# Patient Record
Sex: FEMALE | Race: WHITE
[De-identification: names, ages, dates, MRNs, and addresses within clinical notes are randomized per-mention and may not be internally consistent; named-entity substitution may affect disease eponyms.]

---

## 2017-09-14 ENCOUNTER — HOSPITAL ENCOUNTER (OUTPATIENT)
Dept: HOSPITAL 76 - LAB.N | Age: 67
Discharge: HOME | End: 2017-09-14
Attending: NURSE PRACTITIONER
Payer: MEDICARE

## 2017-09-14 DIAGNOSIS — E04.1: ICD-10-CM

## 2017-09-14 DIAGNOSIS — Z79.899: Primary | ICD-10-CM

## 2017-09-14 PROCEDURE — 80175 DRUG SCREEN QUAN LAMOTRIGINE: CPT

## 2017-09-14 PROCEDURE — 36415 COLL VENOUS BLD VENIPUNCTURE: CPT

## 2017-09-14 PROCEDURE — 84443 ASSAY THYROID STIM HORMONE: CPT

## 2019-02-19 ENCOUNTER — HOSPITAL ENCOUNTER (OUTPATIENT)
Dept: HOSPITAL 76 - LAB.N | Age: 69
Discharge: HOME | End: 2019-02-19
Attending: NURSE PRACTITIONER
Payer: MEDICARE

## 2019-02-19 DIAGNOSIS — Z79.899: Primary | ICD-10-CM

## 2019-02-19 LAB
ALBUMIN DIAFP-MCNC: 4.5 G/DL (ref 3.2–5.5)
ALBUMIN/GLOB SERPL: 1.7 {RATIO} (ref 1–2.2)
ALP SERPL-CCNC: 69 IU/L (ref 42–121)
ALT SERPL W P-5'-P-CCNC: 19 IU/L (ref 10–60)
ANION GAP SERPL CALCULATED.4IONS-SCNC: 10 MMOL/L (ref 6–13)
AST SERPL W P-5'-P-CCNC: 23 IU/L (ref 10–42)
BILIRUB BLD-MCNC: 0.5 MG/DL (ref 0.2–1)
BUN SERPL-MCNC: 16 MG/DL (ref 6–20)
CALCIUM UR-MCNC: 9.3 MG/DL (ref 8.5–10.3)
CHLORIDE SERPL-SCNC: 97 MMOL/L (ref 101–111)
CO2 SERPL-SCNC: 25 MMOL/L (ref 21–32)
CREAT SERPLBLD-SCNC: 0.6 MG/DL (ref 0.4–1)
GFRSERPLBLD MDRD-ARVRAT: 100 ML/MIN/{1.73_M2} (ref 89–?)
GLOBULIN SER-MCNC: 2.6 G/DL (ref 2.1–4.2)
GLUCOSE SERPL-MCNC: 116 MG/DL (ref 70–100)
PROT SPEC-MCNC: 7.1 G/DL (ref 6.7–8.2)
SODIUM SERPLBLD-SCNC: 132 MMOL/L (ref 135–145)

## 2019-02-19 PROCEDURE — 80175 DRUG SCREEN QUAN LAMOTRIGINE: CPT

## 2019-02-19 PROCEDURE — 36415 COLL VENOUS BLD VENIPUNCTURE: CPT

## 2019-02-19 PROCEDURE — 80053 COMPREHEN METABOLIC PANEL: CPT

## 2019-06-03 ENCOUNTER — HOSPITAL ENCOUNTER (OUTPATIENT)
Dept: HOSPITAL 76 - LAB.N | Age: 69
Discharge: HOME | End: 2019-06-03
Attending: NURSE PRACTITIONER
Payer: MEDICARE

## 2019-06-03 DIAGNOSIS — E87.1: Primary | ICD-10-CM

## 2019-06-03 LAB
ANION GAP SERPL CALCULATED.4IONS-SCNC: 10 MMOL/L (ref 6–13)
BUN SERPL-MCNC: 13 MG/DL (ref 6–20)
CALCIUM UR-MCNC: 9.4 MG/DL (ref 8.5–10.3)
CHLORIDE SERPL-SCNC: 101 MMOL/L (ref 101–111)
CO2 SERPL-SCNC: 23 MMOL/L (ref 21–32)
CREAT SERPLBLD-SCNC: 0.8 MG/DL (ref 0.4–1)
GFRSERPLBLD MDRD-ARVRAT: 72 ML/MIN/{1.73_M2} (ref 89–?)
GLUCOSE SERPL-MCNC: 97 MG/DL (ref 70–100)
SODIUM SERPLBLD-SCNC: 134 MMOL/L (ref 135–145)

## 2019-06-03 PROCEDURE — 80048 BASIC METABOLIC PNL TOTAL CA: CPT

## 2019-06-03 PROCEDURE — 36415 COLL VENOUS BLD VENIPUNCTURE: CPT

## 2020-12-15 ENCOUNTER — HOSPITAL ENCOUNTER (OUTPATIENT)
Dept: HOSPITAL 76 - LAB.WCP | Age: 70
Discharge: HOME | End: 2020-12-15
Attending: NURSE PRACTITIONER
Payer: MEDICAID

## 2020-12-15 DIAGNOSIS — E87.1: ICD-10-CM

## 2020-12-15 DIAGNOSIS — E04.1: Primary | ICD-10-CM

## 2020-12-15 LAB
ALBUMIN DIAFP-MCNC: 4.6 G/DL (ref 3.2–5.5)
ALBUMIN/GLOB SERPL: 1.8 {RATIO} (ref 1–2.2)
ALP SERPL-CCNC: 68 IU/L (ref 42–121)
ALT SERPL W P-5'-P-CCNC: 21 IU/L (ref 10–60)
ANION GAP SERPL CALCULATED.4IONS-SCNC: 10 MMOL/L (ref 6–13)
AST SERPL W P-5'-P-CCNC: 28 IU/L (ref 10–42)
BASOPHILS NFR BLD AUTO: 0.1 10^3/UL (ref 0–0.1)
BASOPHILS NFR BLD AUTO: 0.9 %
BILIRUB BLD-MCNC: 0.8 MG/DL (ref 0.2–1)
BUN SERPL-MCNC: 10 MG/DL (ref 6–20)
CALCIUM UR-MCNC: 9.9 MG/DL (ref 8.5–10.3)
CHLORIDE SERPL-SCNC: 101 MMOL/L (ref 101–111)
CO2 SERPL-SCNC: 26 MMOL/L (ref 21–32)
CREAT SERPLBLD-SCNC: 0.8 MG/DL (ref 0.4–1)
EOSINOPHIL # BLD AUTO: 0.1 10^3/UL (ref 0–0.7)
EOSINOPHIL NFR BLD AUTO: 1.7 %
ERYTHROCYTE [DISTWIDTH] IN BLOOD BY AUTOMATED COUNT: 13.1 % (ref 12–15)
GLOBULIN SER-MCNC: 2.6 G/DL (ref 2.1–4.2)
GLUCOSE SERPL-MCNC: 126 MG/DL (ref 70–100)
HGB UR QL STRIP: 13.6 G/DL (ref 12–16)
LYMPHOCYTES # SPEC AUTO: 1.7 10^3/UL (ref 1.5–3.5)
LYMPHOCYTES NFR BLD AUTO: 20.6 %
MCH RBC QN AUTO: 29.8 PG (ref 27–31)
MCHC RBC AUTO-ENTMCNC: 32.9 G/DL (ref 32–36)
MCV RBC AUTO: 90.6 FL (ref 81–99)
MONOCYTES # BLD AUTO: 0.7 10^3/UL (ref 0–1)
MONOCYTES NFR BLD AUTO: 8.7 %
NEUTROPHILS # BLD AUTO: 5.5 10^3/UL (ref 1.5–6.6)
NEUTROPHILS # SNV AUTO: 8 X10^3/UL (ref 4.8–10.8)
NEUTROPHILS NFR BLD AUTO: 67.9 %
PDW BLD AUTO: 9.8 FL (ref 7.9–10.8)
PLATELET # BLD: 309 10^3/UL (ref 130–450)
PROT SPEC-MCNC: 7.2 G/DL (ref 6.7–8.2)
RBC MAR: 4.56 10^6/UL (ref 4.2–5.4)
SODIUM SERPLBLD-SCNC: 137 MMOL/L (ref 135–145)

## 2020-12-15 PROCEDURE — 85025 COMPLETE CBC W/AUTO DIFF WBC: CPT

## 2020-12-15 PROCEDURE — 84443 ASSAY THYROID STIM HORMONE: CPT

## 2020-12-15 PROCEDURE — 36415 COLL VENOUS BLD VENIPUNCTURE: CPT

## 2020-12-15 PROCEDURE — 80053 COMPREHEN METABOLIC PANEL: CPT

## 2021-04-08 ENCOUNTER — HOSPITAL ENCOUNTER (OUTPATIENT)
Dept: HOSPITAL 76 - LAB.WCP | Age: 71
Discharge: HOME | End: 2021-04-08
Attending: FAMILY MEDICINE
Payer: MEDICARE

## 2021-04-08 DIAGNOSIS — E78.5: Primary | ICD-10-CM

## 2021-04-08 LAB
CHOLEST SERPL-MCNC: 176 MG/DL
HDLC SERPL-MCNC: 68 MG/DL
HDLC SERPL: 2.6 {RATIO} (ref ?–4.4)
LDLC SERPL CALC-MCNC: 91 MG/DL
LDLC/HDLC SERPL: 1.3 {RATIO} (ref ?–4.4)
TRIGL P FAST SERPL-MCNC: 87 MG/DL
VLDLC SERPL-SCNC: 17 MG/DL

## 2021-04-08 PROCEDURE — 83721 ASSAY OF BLOOD LIPOPROTEIN: CPT

## 2021-04-08 PROCEDURE — 80061 LIPID PANEL: CPT

## 2021-04-08 PROCEDURE — 36415 COLL VENOUS BLD VENIPUNCTURE: CPT

## 2021-04-09 ENCOUNTER — HOSPITAL ENCOUNTER (OUTPATIENT)
Dept: HOSPITAL 76 - LAB.WCP | Age: 71
Discharge: HOME | End: 2021-04-09
Attending: FAMILY MEDICINE
Payer: MEDICARE

## 2021-04-09 DIAGNOSIS — R32: Primary | ICD-10-CM

## 2021-04-09 LAB
CLARITY UR REFRACT.AUTO: CLEAR
GLUCOSE UR QL STRIP.AUTO: NEGATIVE MG/DL
KETONES UR QL STRIP.AUTO: NEGATIVE MG/DL
NITRITE UR QL STRIP.AUTO: NEGATIVE
PH UR STRIP.AUTO: 6.5 PH (ref 5–7.5)
PROT UR STRIP.AUTO-MCNC: NEGATIVE MG/DL
RBC # UR STRIP.AUTO: NEGATIVE /UL
RBC # URNS HPF: (no result) /HPF (ref 0–5)
SP GR UR STRIP.AUTO: 1.01 (ref 1–1.03)
SQUAMOUS URNS QL MICRO: (no result)
UROBILINOGEN UR QL STRIP.AUTO: (no result) E.U./DL
UROBILINOGEN UR STRIP.AUTO-MCNC: NEGATIVE MG/DL
WBC # UR MANUAL: (no result) /HPF (ref 0–5)

## 2021-04-09 PROCEDURE — 81001 URINALYSIS AUTO W/SCOPE: CPT

## 2021-04-09 PROCEDURE — 87086 URINE CULTURE/COLONY COUNT: CPT

## 2021-04-29 ENCOUNTER — HOSPITAL ENCOUNTER (OUTPATIENT)
Dept: HOSPITAL 76 - NS | Age: 71
Discharge: HOME | End: 2021-04-29
Attending: FAMILY MEDICINE
Payer: MEDICARE

## 2021-04-29 DIAGNOSIS — Z71.3: Primary | ICD-10-CM

## 2021-04-29 DIAGNOSIS — R53.83: ICD-10-CM

## 2021-04-29 PROCEDURE — 97802 MEDICAL NUTRITION INDIV IN: CPT

## 2021-06-10 ENCOUNTER — HOSPITAL ENCOUNTER (OUTPATIENT)
Dept: HOSPITAL 76 - DI | Age: 71
Discharge: HOME | End: 2021-06-10
Attending: FAMILY MEDICINE
Payer: MEDICARE

## 2021-06-10 ENCOUNTER — HOSPITAL ENCOUNTER (OUTPATIENT)
Dept: HOSPITAL 76 - DI | Age: 71
Discharge: HOME | End: 2021-06-10
Attending: GENERAL ACUTE CARE HOSPITAL
Payer: MEDICARE

## 2021-06-10 DIAGNOSIS — Z12.2: Primary | ICD-10-CM

## 2021-06-10 DIAGNOSIS — R91.8: ICD-10-CM

## 2021-06-10 DIAGNOSIS — Z87.891: ICD-10-CM

## 2021-06-10 DIAGNOSIS — Z12.31: Primary | ICD-10-CM

## 2021-06-10 DIAGNOSIS — R92.8: ICD-10-CM

## 2021-06-10 DIAGNOSIS — E04.9: ICD-10-CM

## 2021-06-10 NOTE — CT REPORT
PROCEDURE:  Low Dose Lung Cancer Screen

 

INDICATIONS:  HISTORY OF TOBACCO USE

 

TECHNIQUE:  

Noncontrast low-dose 5 mm thick sections acquired from the pulmonary apices to the posterior costophr
enic angles.  7 mm thick coronal and sagittal MIP reformats were then acquired.  For radiation dose r
eduction, the following was used:  automated exposure control, adjustment of mA and/or kV according t
o patient size.

 

COMPARISON:  None.

 

FINDINGS:  

Image quality:  Excellent.  

 

Lungs and pleura:  There is a spiculated, subsolid pulmonary nodule in the posterior right lower lobe
 with a somewhat lobulated appearance that exists in the area of pre-existing cavities. On image 216/
4 it measures 2.3 cm in diameter. It is highly suspicious for bronchogenic carcinoma. A short distanc
e from there is a groundglass opacity in the right lower lobe which measures 0.8 cm on image 208/4.

 

Mediastinum:  Heart size is normal.  No pericardial effusion.  Minimal coronary artery calcifications
. No mediastinal adenopathy by size criteria.  Thoracic aorta and central pulmonary arteries are norm
al in size.  Esophagus is normal in caliber.  No hiatal hernia.  

 

Bones and chest wall:  No suspicious bony lesions.  No vertebral body compression fractures.  No axil
angel or supraclavicular adenopathy by size criteria.  The thyroid is suboptimally visualized on this 
low-dose study. It appears enlarged.

 

Abdomen:  Visualized upper abdomen solid organs and bowel loops appear normal in the absence of contr
ast.  

 

IMPRESSION:  

1. Spiculated, somewhat lobulated, subsolid right lower lobe mass measuring at least 2.3 cm in diamet
er, occurring in a pre-existing cavitary region, highly suspicious for bronchogenic carcinoma. It mos
t likely represents adenocarcinoma.

2. There is also a subtle groundglass opacity measuring 0.8 cm in the right lower lobe, fairly close 
to the spiculated lesion. This may potentially represent an area of carcinomas in situ.

3. Enlarged thyroid

 

Comment: Recommend PET/CT. The 2.3 cm pulmonary lesion would be amenable to CT-guided biopsy. However
, there would be an increased risk of requiring a chest tube due to the pulmonary cavities.

 

Additional comment: Consider thyroid ultrasound for better evaluation of the thyroid.

 

 

CLINICAL RECOMMENDATION STATEMENTS:

In patients <35 years with an ITN detected on CT, MRI, or extrathyroidal ultrasound, the Committee re
commends further evaluation with dedicated thyroid ultrasound if the nodule is ?1 cm and has no suspi
cious imaging features, and if the patient has normal life expectancy.

In patients ?35 years with an ITN detected on CT, MRI, or extrathyroidal ultrasound, the Committee re
commends further evaluation with dedicated thyroid ultrasound if the nodule is ?1.5 cm and has no mini
picious imaging features, and if the patient has normal life expectancy. (ACR, 2014)

 

Reviewed by: Jean Jolly MD on 6/10/2021 2:38 PM PDT

Approved by: Jean Jolly MD on 6/10/2021 2:38 PM PDT

 

 

Station ID:  [id number]

## 2021-06-11 NOTE — MAMMOGRAPHY REPORT
BILATERAL DIGITAL SCREENING MAMMOGRAM 3D/2D: 6/10/2021

 

CLINICAL: Baseline exam. Routine screening. Pt states she has not had a mammogram in over 20 years or
 more. Maybe had one prior to moving here 14 years ago over 20 years ago.  

 

No prior exams were available for comparison.  There are scattered fibroglandular elements in both br
easts.  

 

 

There is a focal asymmetry in the left breast at 12 o'clock anterior depth.  

No other significant masses, calcifications, or other findings are seen in either breast.  

 

IMPRESSION: INCOMPLETE: NEEDS ADDITIONAL IMAGING EVALUATION

The focal asymmetry in the left breast is indeterminate.  Additional views with possible ultrasound a
re recommended.  

 

 

 

This exam was interpreted at Station ID: [id number].  

 

NOTE: For mammograms, a report in lay terms will be sent to the patient. Approximately 15% of breast 
malignancies will not be visualized mammographically. In the management of a palpable breast mass, a 
negative mammogram must not discourage biopsy of a clinically suspicious lesion.

 

Electronically Signed By: Kelton Graff M.D., jr/lisa:6/10/2021 15:10:12  

 

 

 

ACR BI-RADS Category 0: Incomplete 3340F

PARENCHYMAL PATTERN: (A) - The breast(s) demonstrate(s) scattered fibroglandular densities.

BI-RADS CATEGORY: (0) - 0

RECOMMENDATION: (ADDMAM) - Recommend additional mammographic views.

[id number]

Immediate follow-up

LATERALITY: (B)

## 2021-12-02 ENCOUNTER — HOSPITAL ENCOUNTER (OUTPATIENT)
Dept: HOSPITAL 76 - DI | Age: 71
Discharge: HOME | End: 2021-12-02
Attending: FAMILY MEDICINE
Payer: MEDICARE

## 2021-12-02 DIAGNOSIS — N63.22: Primary | ICD-10-CM

## 2021-12-03 NOTE — MAMMOGRAPHY REPORT
UNILATERAL LEFT DIGITAL DIAGNOSTIC MAMMOGRAM 3D/2D: 12/2/2021

CLINICAL: Patient returns today to evaluate a focal asymmetry in the left breast.  

 

Comparison is made to exam dated:  6/10/2021 mammogram - [facility].  There are sca
ttered fibroglandular elements in left breast.  

 

There is an oval equal density focal asymmetry with an indistinct margin in the left breast at 12 o'c
lock middle depth.  This is less prominent on additional views.  

No other significant masses or calcifications are seen in the breast.  

 

IMPRESSION: INCOMPLETE: NEEDS ADDITIONAL IMAGING EVALUATION

The oval equal density focal asymmetry in the left breast is indeterminate.  An ultrasound is recomme
nded.  

 

Ultrasound will be performed immediately following the current exam. 

 

This exam was interpreted at Station ID: [id number].  

 

NOTE: For mammograms, a report in lay terms will be sent to the patient. Approximately 15% of breast 
malignancies will not be visualized mammographically. In the management of a palpable breast mass, a 
negative mammogram must not discourage biopsy of a clinically suspicious lesion.

 

Electronically Signed By: Ruiz Rocha M.D.          

ddp/:12/2/2021 11:52:56  

 

 

 

ACR BI-RADS Category 0: Incomplete 3340F

PARENCHYMAL PATTERN: (A) - The breast(s) demonstrate(s) scattered fibroglandular densities.

BI-RADS CATEGORY: (0) - 0

Ultrasound

[id number]

Immediate follow-up

LATERALITY: (B)

## 2021-12-03 NOTE — ULTRASOUND REPORT
LIMITED ULTRASOUND OF LEFT BREAST: 12/2/2021

CLINICAL: Patient returns today to evaluate a focal asymmetry in the left breast.  

 

Comparison is made to exams dated:  12/2/2021 mammogram and 6/10/2021 mammogram - [facility].  

 Color flow and real-time ultrasound of the left breast 11-1 o'clock region were performed on the are
as of interest.  Gray scale images of the real-time examination were reviewed.  

 

There is a 1 cm x 0.2 cm x 0.3 cm oval mass suggestive of a lymph node with circumscribed margins in 
the left breast at 1 o'clock middle depth 4 cm from the nipple.  This oval lymph node is of mixed ech
ogenicity with a fatty hilum.  This correlates with mammography findings.  Color flow imaging demonst
rates that there is an adjacent vascularity in the region of the hilum.  

 

 

IMPRESSION: PROBABLY BENIGN 

The 1 cm x 0.2 cm x 0.3 cm oval mass suggestive of a lymph node in the left breast is probably benign
.  Follow-up mammogram and ultrasound in 6 months are recommended.  

 

A follow-up mammogram and an ultrasound in 6 months is recommended to demonstrate stability.   

 

This exam was interpreted at Station ID: [id number].  

Electronically Signed By: Ruiz Rocha M.D.  

ddp/:12/2/2021 13:28:04  

 

 

 

Ultrasound BI-RADS: 3 Probably benign

BI-RADS CATEGORY: (3) - 3

Mammo and US

[id number]

6 month follow-up

LATERALITY: (B)

## 2021-12-15 ENCOUNTER — HOSPITAL ENCOUNTER (OUTPATIENT)
Dept: HOSPITAL 76 - LAB | Age: 71
Discharge: HOME | End: 2021-12-15
Attending: FAMILY MEDICINE
Payer: MEDICARE

## 2021-12-15 DIAGNOSIS — G43.909: Primary | ICD-10-CM

## 2021-12-15 DIAGNOSIS — C34.90: ICD-10-CM

## 2021-12-15 LAB
CREAT SERPLBLD-SCNC: 0.7 MG/DL (ref 0.4–1)
GFRSERPLBLD MDRD-ARVRAT: 83 ML/MIN/{1.73_M2} (ref 89–?)

## 2021-12-15 PROCEDURE — 70553 MRI BRAIN STEM W/O & W/DYE: CPT

## 2021-12-15 PROCEDURE — 36415 COLL VENOUS BLD VENIPUNCTURE: CPT

## 2021-12-15 PROCEDURE — 82565 ASSAY OF CREATININE: CPT

## 2021-12-15 NOTE — MRI REPORT
PROCEDURE:  Brain W/WO

 

INDICATIONS:  MIGRAINE HA, ADENOCARCINOMA

 

CONTRAST:  IV CONTRAST: Gadavist ml: 6.0  

 

TECHNIQUE:  

Noncontrast axial T1 spin echo, axial T2 fast spin echo, sagittal and axial FLAIR, coronal T2 fast sp
in echo, axial gradient echo, axial diffusion and ADC through the brain.  After the administration of
 contrast, axial and coronal T1 spin echo with fat saturation through the brain.  

 

COMPARISON:  None.

 

FINDINGS:  

Image quality:  Excellent.  

 

CSF spaces:  Basal cisterns are patent.  No extra-axial fluid collections.  Ventricles are normal in 
size and shape.  

 

Brain:  No midline shift.  No intracranial bleeds or masses.  No abnormal intracranial enhancement.  
There is cerebral volume loss for age.  There is very mild periventricular white matter chronic small
 vessel ischemic change.  The brainstem appears normal.  Diffusion-weighted images demonstrate no acu
te ischemic insults.  No chronic ischemic insults.  Normal intravascular flow voids are present.  

 

Skull and face:  Calvarial marrow is normal in signal.  Orbits appear normal.  

 

Sinuses:  Sinuses and mastoids appear clear.  

 

IMPRESSION:  Normal brain MRI with and without contrast for patient age. No evidence acute stroke, he
morrhage, or mass.

 

Reviewed by: Jean Jolly MD on 12/15/2021 2:28 PM PST

Approved by: Jean Jolly MD on 12/15/2021 2:28 PM PST

 

 

Station ID:  [id number]

## 2021-12-20 ENCOUNTER — HOSPITAL ENCOUNTER (OUTPATIENT)
Dept: HOSPITAL 76 - SC | Age: 71
Discharge: HOME | End: 2021-12-20
Attending: INTERNAL MEDICINE
Payer: MEDICARE

## 2021-12-20 DIAGNOSIS — R06.81: ICD-10-CM

## 2021-12-20 DIAGNOSIS — G47.10: Primary | ICD-10-CM

## 2021-12-20 DIAGNOSIS — G47.8: ICD-10-CM

## 2021-12-20 PROCEDURE — 99202 OFFICE O/P NEW SF 15 MIN: CPT

## 2021-12-20 PROCEDURE — 99212 OFFICE O/P EST SF 10 MIN: CPT

## 2021-12-21 VITALS — SYSTOLIC BLOOD PRESSURE: 104 MMHG | DIASTOLIC BLOOD PRESSURE: 64 MMHG

## 2021-12-21 NOTE — SLEEP CARE CONSULTATION
Information from patient questionnaire entered by Diego Otto MA.





I have reviewed and concur with the information entered by Diego Otto MA. 

This document represents the service I personally performed and the decisions 

made by me, Cornelius Cash MD, [facility].





History of Present Illness


Service Date and Time: 2021    1030


Reason for Visit: New patient


Chief Complaint: reports: Excessive daytime sleepiness, Observed pauses in 

breathing, Fatigue, Frequent awakenings at night


Date of Onset: at least 5 years


Usual bedtime: 1100 pm


Time it takes to fall asleep: less than 5 minutes


Snores at night: No


Observed to quit breathing while asleep: Yes


Number of times waking at night: 3


Reasons for waking at night: reports: Gasping for air, Bathroom, Other (unknown 

reasons)


Toss, Turn, or Twitch while sleeping: Yes


Recalls having dreams: Yes


Usually gets out of bed at: 0700


Feels refreshed in the morning: No


Morning headache: No


Sleepy or fatigued during the day: Yes


Ever fallen asleep while driving: No


Takes day naps: Yes


Dreams during day naps: No


Prior sleep studies: No


Additional HPI information: 


I have the pleasure of seeing Ms. Pepper today regarding the possibility of her 

having obstructive sleep apnea.  As you know, she is a 71 year old lady who 

complains of observed apneas, frequent awakenings, persistent fatigue, and 

excessive daytime sleepiness for at least 5 years.  A sister who is a sleep 

technician told her that she quits breathing at night.  She does not snore much.

 The patient tells me that she normally goes to bed around 11 pm, and it takes 

her approximately < 5 minutes to fall asleep.  She can recall waking up on the 

average of 3 times during the night.  Most of the time she wakes up because of 

having to use the bathroom.    She has awakened occasionally because of her own 

snoring, choking, and having to gasp for air.  There is a lot of tossing and 

turning in her sleep.  She reports somniloquy (sleep talking) and somnambulism 

(sleep walking).  Generally, she can recall having dreams.  In the morning she 

usually gets up out of the bed around 7 a.m. not feeling refreshed nor rested.  

She usually does not have a morning headache.  During the day she complains of 

feeling sleepy and fatigued.  Her score on [place] Sleepiness Scale is 13 out of

24.  She has fallen asleep while driving and has gone out of the nuno.  She 

usually does not take naps during the day.  Upon falling asleep during the day 

she denies having vivid dreams.  She has never had sleep paralysis, experienced 

cataplexy but reports symptoms of restless leg syndrome.  She reports having 

impaired concentration during the day.








- Parasomnia Symptoms


Ever been unable to move upon waking from sleep: Yes (in 20s)


Walks in sleep: Yes (as a child)


Talks in sleep: Yes


Ever acted out dreams in sleep: No


Ever felt weak in the knees when startled or emotional: No


Bothered by creepy, crawly, restless sensations in legs: Yes


Problems with memory or concentration: Yes





Subjective


Initial [place] Sleepiness Scale score: 13 ()





Past Medical History


Past Medical History: reports: Claustrophobia, Arthritis, Anxiety, Depression, 

Attention deficit





Social History


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 Patient is Single and lives in [place]. 





Have you smoked in the past 12 months: Yes


Cigarettes per day (20/pack): 9


Years of smokin


Quit date: 


Smoking Pack Years: 14.0


Alcohol use: Yes


Alcohol amount and frequency: 2 x yearly


Caffeine use: Yes


Caffeine amount and frequency: 2 x daily





Family History


Family Hx Sleep Apnea: Mother: Sleep apnea - Untreated





Allergies and Home Medications


Known drug allergies: Yes ([facility], Erythomycine, Clindomycine)


Drug allergies reviewed: Yes


Home medication list reviewed: Yes


Allergy and home medication list: 





Magnesium 350 mg daily





Vitamin D3 4000mg daily





Fish Oil 2400mg   daily





Biotin  2500mg daily





Kelp  550mg daily





Coenzyme B-complex  1000 daily





L-Lysine  1000mg 3-4x week  





LEIGH Lieberman 21 10:45 am 











Review of Systems


Cardiovascular: denies: high blood pressure, palpitations, chest pain, irregular

heart rate or pulse, leg or foot swelling, have to sleep sitting up, other


Respiratory: denies: shortness of breath, wheeze, sputum production, chronic 

cough, other


Gastrointestinal: denies: heartburn, difficulty swallowing, nausea, vomitting, 

diarrhea, abdominal pain, other


Urinary: reports: incontinence, frequency


Neurological: reports: headaches


Psychiatric: denies: Attention Deficit Hyperactivity, anxiety, depression, mood 

disorder, claustrophobia, other


Ear/Nose/Throat: reports: sinus problems


Endocrine: denies: thyroid disease, history of goiter, sluggishness, too hot or 

cold, excessive thirst, increased appetite, increased urination, unexplained 

weakness, other


Musculoskeletal: denies: joint pain, neck pain, back pain, joint swelling, 

muscle pain or cramping, mobility problems, other


Immunologic: reports: sneezing, rash, itching





Physical Exam


Vital signs obtained and entered by: TOBIAS OTTO CMA AAMA


Blood Pressure: 104/64 (left)


Cuff size: wrist


Heart Rate: 54


O2 Saturation: 98 (with mask )


Height: 5 ft 3 in


Weight: 100 lb


Body Mass Index: 17.6


BMI Classification: Underweight





Impression and Plan


IMPRESSION: 1. Obstructive Sleep Apnea-Hypopnea Syndrome, as evident by history 

of observed cessation of breath while asleep, frequent awakenings during the 

night, nocturnal choking, unrefreshed sleep, cognitive impairment, and daytime 

hypersomnolence. I recommend proceeding to polysomnography to confirm the 

diagnosis and to assess severity.  If she has significant sleep disordered 

breathing, a manual CPAP titration study will also be performed to find the 

optimal treatment pressure.  I informed the patient of what the sleep studies in

volve and after some discussion, she agreed to proceed.





Plan:  1. Schedule a home sleep apnea test (HSAT) because her insurance is 

[facility].


2. Avoid long distance driving or when feeling sleepy.


3. Avoid alcohol, sedative and muscle relaxant around bedtime.





Follow up with Sleep Care in: 1-2 months


Visit Type: In Office


Time Spent with Patient (minutes): 15


Provider Statement: I spent 100% of the Face to Face Visit with the patient with

greater than 50% spent counseling the patient and coordination of care.

## 2022-01-25 ENCOUNTER — HOSPITAL ENCOUNTER (OUTPATIENT)
Dept: HOSPITAL 76 - SC | Age: 72
Discharge: HOME | End: 2022-01-25
Attending: INTERNAL MEDICINE
Payer: MEDICARE

## 2022-01-25 DIAGNOSIS — R41.89: ICD-10-CM

## 2022-01-25 DIAGNOSIS — G47.8: ICD-10-CM

## 2022-01-25 DIAGNOSIS — G47.10: Primary | ICD-10-CM

## 2022-01-25 DIAGNOSIS — R06.81: ICD-10-CM

## 2022-01-25 PROCEDURE — 95806 SLEEP STUDY UNATT&RESP EFFT: CPT

## 2022-02-07 ENCOUNTER — HOSPITAL ENCOUNTER (OUTPATIENT)
Dept: HOSPITAL 76 - SC | Age: 72
Discharge: HOME | End: 2022-02-07
Attending: INTERNAL MEDICINE
Payer: MEDICARE

## 2022-02-07 VITALS — SYSTOLIC BLOOD PRESSURE: 140 MMHG | DIASTOLIC BLOOD PRESSURE: 85 MMHG

## 2022-02-07 DIAGNOSIS — R53.83: ICD-10-CM

## 2022-02-07 DIAGNOSIS — G47.10: Primary | ICD-10-CM

## 2022-02-07 PROCEDURE — 99212 OFFICE O/P EST SF 10 MIN: CPT

## 2022-02-07 NOTE — SLEEP CARE CONSULTATION
Information from patient questionnaire entered by Diego Kowalski MA.





I have reviewed and concur with the information entered by Diego Kowalski MA. 

This document represents the service I personally performed and the decisions 

made by me, Cornelius Cash MD, [facility].





History of Present Illness


Service Date and Time: 02/07/2022    1348


Initial [place] Sleepiness Scale score: 13 (2021)


Current [place] Sleepiness Scale score: 10 (2022)


Additional HPI information: 


Ms. Pepper returned for follow up of the home sleep apnea test (HSAT) she had on 

1/25/2022.  The test showed no significant sleep disordered breathing with an 

AHI of 1.9.  The patient slept mostly supine. The test duration was slightly 

short at about 4 hours.  She recalls going to the bathroom around the time test 

ended. 





The patient was informed of these findings.  I explained to her that she quit 

breathing a few times but not enough to make the diagnosis of obstructive sleep 

apnea-hypopnea.  She continues to complain of excessive daytime sleepiness.  Her

[place] Sleepiness Scale score is 10.  








Sleep Study





- Results


Type of Sleep Study: Home sleep study (F/U HOME STUDY)


Prior sleep studies: No





Allergies and Home Medications


Known drug allergies: Yes ([facility],)


Drug allergies reviewed: Yes


Home medication list reviewed: Yes


Allergy and home medication list: 


Allergies





clindamycin Allergy (Verified 04/15/15 18:38)


   Rash


erythromycin base [Erythromycin Base] Allergy (Verified 04/15/15 18:38)


   Emesis


Penicillins Allergy (Verified 04/15/15 18:38)


   Rash











Review of Systems


Review of systems same as previous: Yes





Physical Exam


Vital signs obtained and entered by: TOBIAS KOWALSKI CMA MA


Blood Pressure: 140/85 (LEFT, PULSE 55 )


Cuff size: wrist


Heart Rate: 52


O2 Saturation: 98 (WITH PAPER MASK)


Height: 5 ft 3 in


Weight: 142 lb (WITH CLOTHES)


Body Mass Index: 25.1


BMI Classification: Overweight





Impression and Plan


IMPRESSION: 1. Hypersomnia and fatigue.  This is partly due to her napping on 

most days.  There remains a suspicion for sleep-disordered breathing and 

possible periodic leg movement disorder.  We will proceed to an in-laboratory 

polysomnography for further evaluation as HSAT does have a high false negative 

rate due to its inability to differentiate wake from sleep.





PLAN:      1.   Schedule an in-laboratory polysomnography.


2.   Avoid daytime naps.


3.   Return for a follow up after the sleep study.





Follow up with Sleep Care in: 1-2 months


Visit Type: In Office


Time Spent with Patient (minutes): 15


Provider Statement: I spent 100% of the Face to Face Visit with the patient with

greater than 50% spent counseling the patient and coordination of care.

## 2022-04-20 ENCOUNTER — HOSPITAL ENCOUNTER (OUTPATIENT)
Dept: HOSPITAL 76 - SC | Age: 72
Discharge: HOME | End: 2022-04-20
Attending: NURSE PRACTITIONER
Payer: MEDICARE

## 2022-04-20 VITALS — SYSTOLIC BLOOD PRESSURE: 137 MMHG | DIASTOLIC BLOOD PRESSURE: 68 MMHG

## 2022-04-20 DIAGNOSIS — F41.9: ICD-10-CM

## 2022-04-20 DIAGNOSIS — G47.10: ICD-10-CM

## 2022-04-20 DIAGNOSIS — R06.83: Primary | ICD-10-CM

## 2022-04-20 PROCEDURE — 99212 OFFICE O/P EST SF 10 MIN: CPT

## 2022-04-20 PROCEDURE — 99213 OFFICE O/P EST LOW 20 MIN: CPT

## 2022-04-20 NOTE — SLEEP CARE CONSULTATION
Information from patient questionnaire entered by Diego Kowalski MA.





I have reviewed and concur with the information entered by Diego Kowalski MA. 

This document represents the service I personally performed and the decisions 

made by Marcela hernandez Caren J, ARNP.





History of Present Illness


Service Date and Time: 04/20/2022    1118


Initial [place] Sleepiness Scale score: 13 (2021)


Current [place] Sleepiness Scale score: 11 (4/22)


Additional HPI information: 





ALEXA ACEVEDO returns for follow up and results of the recently performed 

polysomnography.





The patient was informed of the following findings:  No significant sleep 

disordered breathing with an average AHI of 0.4 and galo oxygen saturation of 

91%. 





I explained the pathophysiology behind obstructive sleep apnea. Patient does not

have sleep apnea and was advised how weight gain could increase the risk of 

developing sleep apnea in the future.





Patient has light snoring. Snoring can be reduced by weight loss. Weight loss is

best achieved with diet consult. Patient instructed to contact PCP for referral.

Snoring can also be treated with an oral appliance from a dentist. Advised to 

check insurance coverage. In addition, an ENT evaluation can be do to see if 

other treatment is indicated.  Patient was cautioned about risks of drowsy 

driving until sleepiness symptoms resolve.





Sleep Study





- Results


Type of Sleep Study: Polysomnography (F/U POLY, 4/1/22 [facility],)


Prior sleep studies: No


Polysomnography/Home Sleep Study results: 





IMPRESSION: The quality of the study is good. The patient had normal sleep 

efficiency. The sleep architecture


was also normal. Respiratory monitoring showed no significant sleep disordered 

breathing (AHI = 0.4) or hypoxia


(galo oxygen saturation of 91%). The patient slept mostly supine (supine AHI = 

0.5; non-supine = 0.00). Snore


was infrequent and light in intensity. There was no significant periodic leg 

movement of sleep. Cardiac rhythm was


normal sinus rhythm without significant arrhythmia. No abnormal behavior 

(parasomnia) observed during the night.





Allergies and Home Medications


Known drug allergies: Yes (same )


Drug allergies reviewed: Yes


Home medication list reviewed: Yes (lexapro)


Allergy and home medication list: 


Allergies





clindamycin Allergy (Verified 04/15/15 18:38)


   Rash


erythromycin base [Erythromycin Base] Allergy (Verified 04/15/15 18:38)


   Emesis


Penicillins Allergy (Verified 04/15/15 18:38)


   Rash





Medications:


 She is taking Benadryl, Valerian root and Calms Forte which is helping her 

sleep more than the Lexapro.








Review of Systems


Review of systems same as previous: No (anxiety worse in last month, seeing 

PCP/therapist)





Physical Exam


Vital signs obtained and entered by: TOBIAS KOWALSKI CMA AAMA


Blood Pressure: 137/68 (left, pulse 63, resp 16, )


Cuff size: wrist


Heart Rate: 62


O2 Saturation: 97


Height: 5 ft 3 in


Weight: 124 lb (clothes)


Weight change since last visit: lost weight, does not want to seeing her pcp for

the weight loss, 


Body Mass Index: 21.9


BMI Classification: Healthy weight





Impression and Plan





1.  Snoring but no significant sleep disordered breathing. Patient has issues 

with anxiety which could be contributing to poor sleep and daytime fatigue. I 

advised her to follow up with her primary doctor and therapist for further 

evaluation and treatment as needed. Patient advised that often weight loss will 

reduce snoring as well as apnea risk. An oral appliance can also be used for 

snoring. This would require a dental consultation. Patient cautioned not to use 

other online appliances as can cause bite issues. A list of accredited dentists 

in [facility] and one local dentist who makes oral appliances is available in the 

office. Patient is advised to check if insurance will cover. An ENT consult can 

also be helpful to determine if any other treatment is an option.





* Maintain a healthy weight


* Follow up with primary provider for her anxiety


* Return as needed for follow up.








Counseling Topics: Weight control


Visit Type: In Office


Time Spent with Patient (minutes): 23


Provider Statement: I spent 100% of the Face to Face Visit with the patient with

greater than 50% spent counseling the patient and coordination of care.

## 2022-10-18 ENCOUNTER — HOSPITAL ENCOUNTER (OUTPATIENT)
Dept: HOSPITAL 76 - LAB.N | Age: 72
Discharge: HOME | End: 2022-10-18
Attending: PHYSICIAN ASSISTANT
Payer: MEDICARE

## 2022-10-18 DIAGNOSIS — Z13.220: Primary | ICD-10-CM

## 2022-10-18 LAB
CHOLEST SERPL-MCNC: 216 MG/DL
HDLC SERPL-MCNC: 69 MG/DL
HDLC SERPL: 3.1 {RATIO} (ref ?–4.4)
LDLC SERPL CALC-MCNC: 128 MG/DL
LDLC/HDLC SERPL: 1.9 {RATIO} (ref ?–4.4)
TRIGL P FAST SERPL-MCNC: 95 MG/DL
VLDLC SERPL-SCNC: 19 MG/DL

## 2022-10-18 PROCEDURE — 36415 COLL VENOUS BLD VENIPUNCTURE: CPT

## 2022-10-18 PROCEDURE — 80061 LIPID PANEL: CPT

## 2022-10-18 PROCEDURE — 83721 ASSAY OF BLOOD LIPOPROTEIN: CPT

## 2022-10-31 ENCOUNTER — HOSPITAL ENCOUNTER (OUTPATIENT)
Dept: HOSPITAL 76 - EMS | Age: 72
Discharge: ANOTHER HOSPITAL | End: 2022-10-31
Payer: MEDICARE

## 2022-10-31 DIAGNOSIS — M25.551: ICD-10-CM

## 2022-10-31 DIAGNOSIS — M79.604: ICD-10-CM
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
## 2022-10-31 DIAGNOSIS — S79.911A: Primary | ICD-10-CM
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
## 2023-02-17 ENCOUNTER — HOSPITAL ENCOUNTER (OUTPATIENT)
Dept: HOSPITAL 76 - DI | Age: 73
Discharge: HOME | End: 2023-02-17
Attending: PHYSICIAN ASSISTANT
Payer: MEDICARE

## 2023-02-17 DIAGNOSIS — M85.88: ICD-10-CM

## 2023-02-17 DIAGNOSIS — Z78.0: Primary | ICD-10-CM

## 2023-04-12 ENCOUNTER — HOSPITAL ENCOUNTER (OUTPATIENT)
Dept: HOSPITAL 76 - DI | Age: 73
Discharge: HOME | End: 2023-04-12
Attending: PHYSICIAN ASSISTANT
Payer: MEDICARE

## 2023-04-12 DIAGNOSIS — M79.651: Primary | ICD-10-CM

## 2023-04-12 DIAGNOSIS — S72.301D: ICD-10-CM

## 2023-04-12 NOTE — XRAY REPORT
PROCEDURE:  Hip w/Pelvis 2-3V RT

 

INDICATIONS:  PAIN IN RT THIGH

 

TECHNIQUE:  AP pelvis with lateral view(s) of the right hip(s).  

 

COMPARISON:  None.

 

FINDINGS:  

 

Bones:  There is prior internal fixation of right proximal femur. Healing comminuted right intertroch
anteric femoral fracture is noted with increased sclerosis and partial bony union at fracture site. N
o evidence of hardware loosening or failure. No acute fractures or dislocations.  No evidence of avas
cular necrosis of femoral head. Pelvic ring appears intact.  No suspicious bony lesions.  

 

Soft tissues:  The visualized bowel gas pattern is normal.  No suspicious soft tissue calcifications.
  

 

IMPRESSION:

 

Prior fixation of right proximal femur with healing right intertrochanteric femoral fracture. No lucio
s hardware loosening or failure. No acute fracture or dislocation. No evidence of avascular necrosis.


 

Reviewed by: Keegan Livingston MD on 4/12/2023 5:33 PM PDT

Approved by: Keegan Livingston MD on 4/12/2023 5:33 PM PDT

 

 

Station ID:  [id number]

## 2023-04-12 NOTE — XRAY REPORT
PROCEDURE:  Femur 2V RT

 

INDICATIONS:  PAIN IN RT THIGH

 

TECHNIQUE:  4 views of the femur were acquired.  

 

COMPARISON:  None.

 

FINDINGS:  

 

Bones:  Post-ORIF changes are noted in right femoral shaft. Healing intertrochanteric fracture of rig
ht proximal femur is seen. No evidence of hardware loosening or failure. No evidence of acute fractur
e or dislocation. No suspicious bony lesions.  

 

Soft tissues:  No suspicious soft tissue calcifications or masses.  

 

IMPRESSION:  

Healing right proximal intertrochanteric femoral fracture with anatomic right femoral alignment. No g
ross hardware loosening or failure. No acute fracture or dislocation.

 

 

 

Reviewed by: Keegan Livingston MD on 4/12/2023 5:32 PM PDT

Approved by: Keegan Livingston MD on 4/12/2023 5:32 PM PDT

 

 

Station ID:  [id number]

## 2023-08-10 ENCOUNTER — HOSPITAL ENCOUNTER (OUTPATIENT)
Dept: HOSPITAL 76 - LAB | Age: 73
Discharge: HOME | End: 2023-08-10
Attending: PHYSICIAN ASSISTANT
Payer: MEDICARE

## 2023-08-10 DIAGNOSIS — C34.90: Primary | ICD-10-CM

## 2023-08-10 DIAGNOSIS — R03.0: ICD-10-CM

## 2023-08-10 DIAGNOSIS — Z13.220: ICD-10-CM

## 2023-08-10 LAB
ALBUMIN DIAFP-MCNC: 4.6 G/DL (ref 3.2–5.5)
ALBUMIN/GLOB SERPL: 1.8 {RATIO} (ref 1–2.2)
ALP SERPL-CCNC: 92 IU/L (ref 42–121)
ALT SERPL W P-5'-P-CCNC: 27 IU/L (ref 10–60)
ANION GAP SERPL CALCULATED.4IONS-SCNC: 7 MMOL/L (ref 6–13)
AST SERPL W P-5'-P-CCNC: 22 IU/L (ref 10–42)
BASOPHILS NFR BLD AUTO: 0 10^3/UL (ref 0–0.1)
BASOPHILS NFR BLD AUTO: 0.6 %
BILIRUB BLD-MCNC: 0.6 MG/DL (ref 0.2–1)
BUN SERPL-MCNC: 12 MG/DL (ref 6–20)
CALCIUM UR-MCNC: 9.9 MG/DL (ref 8.5–10.3)
CHLORIDE SERPL-SCNC: 103 MMOL/L (ref 101–111)
CHOLEST SERPL-MCNC: 258 MG/DL
CO2 SERPL-SCNC: 27 MMOL/L (ref 21–32)
CREAT SERPLBLD-SCNC: 0.7 MG/DL (ref 0.6–1.3)
EOSINOPHIL # BLD AUTO: 0.2 10^3/UL (ref 0–0.7)
EOSINOPHIL NFR BLD AUTO: 2.4 %
ERYTHROCYTE [DISTWIDTH] IN BLOOD BY AUTOMATED COUNT: 13.3 % (ref 12–15)
GFRSERPLBLD MDRD-ARVRAT: 82 ML/MIN/{1.73_M2} (ref 89–?)
GLOBULIN SER-MCNC: 2.6 G/DL (ref 2.1–4.2)
GLUCOSE SERPL-MCNC: 118 MG/DL (ref 74–104)
HCT VFR BLD AUTO: 40.7 % (ref 37–47)
HDLC SERPL-MCNC: 72 MG/DL
HDLC SERPL: 3.6 {RATIO} (ref ?–4.4)
HGB UR QL STRIP: 13.3 G/DL (ref 12–16)
LDLC SERPL CALC-MCNC: 157 MG/DL
LDLC/HDLC SERPL: 2.2 {RATIO} (ref ?–4.4)
LYMPHOCYTES # SPEC AUTO: 2.3 10^3/UL (ref 1.5–3.5)
LYMPHOCYTES NFR BLD AUTO: 36.5 %
MCH RBC QN AUTO: 30.4 PG (ref 27–31)
MCHC RBC AUTO-ENTMCNC: 32.7 G/DL (ref 32–36)
MCV RBC AUTO: 92.9 FL (ref 81–99)
MONOCYTES # BLD AUTO: 0.5 10^3/UL (ref 0–1)
MONOCYTES NFR BLD AUTO: 8.7 %
NEUTROPHILS # BLD AUTO: 3.2 10^3/UL (ref 1.5–6.6)
NEUTROPHILS # SNV AUTO: 6.2 X10^3/UL (ref 4.8–10.8)
NEUTROPHILS NFR BLD AUTO: 51.5 %
NRBC # BLD AUTO: 0 /100WBC
NRBC # BLD AUTO: 0 X10^3/UL
PDW BLD AUTO: 8.8 FL (ref 7.9–10.8)
PLATELET # BLD: 287 10^3/UL (ref 130–450)
POTASSIUM SERPL-SCNC: 4.2 MMOL/L (ref 3.5–4.5)
PROT SPEC-MCNC: 7.2 G/DL (ref 6.4–8.9)
RBC MAR: 4.38 10^6/UL (ref 4.2–5.4)
SODIUM SERPLBLD-SCNC: 137 MMOL/L (ref 135–145)
TRIGL P FAST SERPL-MCNC: 144 MG/DL (ref 48–352)
TSH SERPL-ACNC: 3.15 UIU/ML (ref 0.34–5.6)
VLDLC SERPL-SCNC: 29 MG/DL

## 2023-08-10 PROCEDURE — 85025 COMPLETE CBC W/AUTO DIFF WBC: CPT

## 2023-08-10 PROCEDURE — 83721 ASSAY OF BLOOD LIPOPROTEIN: CPT

## 2023-08-10 PROCEDURE — 80053 COMPREHEN METABOLIC PANEL: CPT

## 2023-08-10 PROCEDURE — 84443 ASSAY THYROID STIM HORMONE: CPT

## 2023-08-10 PROCEDURE — 36415 COLL VENOUS BLD VENIPUNCTURE: CPT

## 2023-08-10 PROCEDURE — 80061 LIPID PANEL: CPT
